# Patient Record
Sex: MALE | Race: BLACK OR AFRICAN AMERICAN
[De-identification: names, ages, dates, MRNs, and addresses within clinical notes are randomized per-mention and may not be internally consistent; named-entity substitution may affect disease eponyms.]

---

## 2022-05-11 ENCOUNTER — HOSPITAL ENCOUNTER (EMERGENCY)
Dept: HOSPITAL 61 - ER | Age: 22
Discharge: HOME | End: 2022-05-11
Payer: COMMERCIAL

## 2022-05-11 VITALS — HEIGHT: 72 IN | BODY MASS INDEX: 34.49 KG/M2 | WEIGHT: 254.63 LBS

## 2022-05-11 VITALS — DIASTOLIC BLOOD PRESSURE: 69 MMHG | SYSTOLIC BLOOD PRESSURE: 127 MMHG
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
## 2022-05-11 DIAGNOSIS — M54.6: Primary | ICD-10-CM

## 2022-05-11 DIAGNOSIS — G89.11: ICD-10-CM
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
## 2022-05-11 PROCEDURE — 72125 CT NECK SPINE W/O DYE: CPT

## 2022-05-11 PROCEDURE — 72128 CT CHEST SPINE W/O DYE: CPT

## 2022-05-11 PROCEDURE — 96372 THER/PROPH/DIAG INJ SC/IM: CPT

## 2022-05-11 PROCEDURE — 99284 EMERGENCY DEPT VISIT MOD MDM: CPT
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
